# Patient Record
Sex: FEMALE | Race: BLACK OR AFRICAN AMERICAN | Employment: UNEMPLOYED | URBAN - METROPOLITAN AREA
[De-identification: names, ages, dates, MRNs, and addresses within clinical notes are randomized per-mention and may not be internally consistent; named-entity substitution may affect disease eponyms.]

---

## 2017-04-03 ENCOUNTER — APPOINTMENT (OUTPATIENT)
Dept: GENERAL RADIOLOGY | Facility: HOSPITAL | Age: 52
End: 2017-04-03
Attending: EMERGENCY MEDICINE
Payer: MEDICAID

## 2017-04-03 PROCEDURE — 71010 XR CHEST AP PORTABLE  (CPT=71010): CPT

## 2017-04-03 NOTE — DISCHARGE PLANNING
Transportation was called through [facility] for patient. [facility] will contact nurses station when transportation is on their way.

## 2017-04-03 NOTE — ED PROVIDER NOTES
Patient Seen in: [facility] AND [facility] Emergency Department    History   Patient presents with:  Hypertension (cardiovascular)    Stated Complaint:     HPI    57-year-old female presents for complaint of withdrawal from heroin.   Patient states that she start systems are as noted in HPI. Constitutional and vital signs reviewed. All other systems reviewed and negative except as noted above. PSFH elements reviewed from today and agreed except as otherwise stated in HPI.     Physical Exam     ED Triage Vit RAINBOW DRAW DARK GREEN   RAINBOW DRAW LAVENDER TALL (BNP)      EKG    Rate, intervals and axes as noted on EKG Report. Rate: 87  Rhythm: Sinus Rhythm  Reading: normal rate, axis, rhythm, intervals. No STEMI, interpreted by ED physician.              MDM questions were addressed and answered.   ,           Disposition and Plan     Clinical Impression:  Heroin withdrawal ([facility] [address]  (primary encounter diagnosis)  Nausea and vomiting in adult  Pain, generalized    Disposition:  Discharge    Follow-up:  Carrie Chen

## 2017-04-03 NOTE — ED NOTES
PT VERBALIZED BEING SOBER FROM HEROIN FOR A YEAR. PT STARTED TO USE HEROIN AND CRACK ON Thursday. PT USED THAT LAST TIME ON Saturday. VERBALIZED HAVING \"A LOT\". PT IS HERE FOR WITHDRAWAL. PT VOMITED X8 THIS AM AND FELT HEART RACING AFTERWARDS.  Roger Ratliff

## (undated) NOTE — ED AVS SNAPSHOT
[facility] Emergency Department    [address] [place] [place] [address]    Phone:  [phone]    Fax:  [phone]           Jone Hylton   MRN: [id number]    Department:  [facility] Emergency Department   Date of Visit:  4/3/2 Take 1 tablet (600 mg total) by mouth every 8 (eight) hours as needed for Pain or Fever. Loperamide HCl 2 MG Tabs   Quantity:  20 tablet   Commonly known as:  IMODIUM A-D   Take 1 tablet (2 mg total) by mouth as needed for Diarrhea.        ondansetron tell this physician (or your personal doctor if your instructions are to return to your personal doctor) about any new or lasting problems.  The primary care or specialist physician may see patients referred from the [facility] Emergency Dep [address] [place] [facility] [employer] ([address] [place] [address],[address]) [address] ([address] [place] [address]) [phone]   Nvaeed Bennett [address] [employer] ([place] [address]) [address] [facility] [address] Itz Poon  (

## (undated) NOTE — ED AVS SNAPSHOT
[facility] Emergency Department    [address] [place] [address]    Phone:  [phone]    Fax:  [phone]           Bong Bell   MRN: [id number]    Department:  [facility] Emergency Department   Date of Visit:  4/3/2 and Class Registration line at [phone] or find a doctor online by visiting [email or web address].    IF THERE IS ANY CHANGE OR WORSENING OF YOUR CONDITION, CALL YOUR PRIMARY CARE PHYSICIAN AT ONCE OR RETURN IMMEDIATELY TO [address] [place] [address].     If